# Patient Record
Sex: FEMALE | Race: ASIAN | NOT HISPANIC OR LATINO | Employment: FULL TIME | URBAN - METROPOLITAN AREA
[De-identification: names, ages, dates, MRNs, and addresses within clinical notes are randomized per-mention and may not be internally consistent; named-entity substitution may affect disease eponyms.]

---

## 2024-07-26 ENCOUNTER — OFFICE VISIT (OUTPATIENT)
Dept: URGENT CARE | Facility: MEDICAL CENTER | Age: 29
End: 2024-07-26
Payer: COMMERCIAL

## 2024-07-26 ENCOUNTER — APPOINTMENT (OUTPATIENT)
Dept: RADIOLOGY | Facility: MEDICAL CENTER | Age: 29
End: 2024-07-26
Payer: COMMERCIAL

## 2024-07-26 VITALS
RESPIRATION RATE: 18 BRPM | OXYGEN SATURATION: 98 % | HEART RATE: 95 BPM | DIASTOLIC BLOOD PRESSURE: 75 MMHG | SYSTOLIC BLOOD PRESSURE: 102 MMHG

## 2024-07-26 DIAGNOSIS — S99.912A ANKLE INJURY, LEFT, INITIAL ENCOUNTER: ICD-10-CM

## 2024-07-26 DIAGNOSIS — S93.602A SPRAIN OF LEFT FOOT, INITIAL ENCOUNTER: Primary | ICD-10-CM

## 2024-07-26 PROCEDURE — S9083 URGENT CARE CENTER GLOBAL: HCPCS

## 2024-07-26 PROCEDURE — 73630 X-RAY EXAM OF FOOT: CPT

## 2024-07-26 PROCEDURE — G0382 LEV 3 HOSP TYPE B ED VISIT: HCPCS

## 2024-07-26 PROCEDURE — 73610 X-RAY EXAM OF ANKLE: CPT

## 2024-07-27 NOTE — PROGRESS NOTES
[facility]        NAME: Essence Winn is a 28 y.o. female  : 1995    MRN: [id number]  DATE: 2024  TIME: 9:02 PM    Assessment and Plan   Sprain of left foot, initial encounter [S93.602A]  1. Sprain of left foot, initial encounter  Ambulatory Referral to Orthopedic Surgery      2. Ankle injury, left, initial encounter  XR ankle 3+ vw left    XR foot 3+ vw left        Left ankle/foot x-rays: No acute osseous abnormality per preliminary read. Radiology to determine final read.    Provided with boot. Advised symptomatic treatment. Referral to orthopedics placed.    Patient Instructions     Your finalized x-ray results will be available on [facility] when read by the radiologist.  Recommend rest, ice, elevation. Over the counter pain medication as directed on packaging as needed for pain (ex: Tylenol, ibuprofen).  Provided with boot, crutches. Weight bear as tolerated.  Referral to orthopedics placed.    Follow up with PCP in 3-5 days.  Proceed to  ER if symptoms worsen.    If tests are performed, our office will contact you with results only if changes need to made to the care plan discussed with you at the visit. You can review your full results on [facility].    Chief Complaint     Chief Complaint   Patient presents with    Ankle Injury     Patient states she rolled her L ankle playing pickleball about one hour ago. She states that it is difficult to put any weight on on that ankle and the pain is radiating up the leg.         History of Present Illness       Ankle Injury   Incident onset: about one hour ago. Incident location: [employer] [address]. Injury mechanism: rolled ankle. The pain is present in the left ankle and left foot. Pain scale: 2/10 currently, up to 8/10 with weight-bearing. Associated symptoms include an inability to bear weight, numbness and tingling. Pertinent negatives include no loss of sensation. The symptoms are aggravated by weight bearing and movement. Treatments  tried: ice. The treatment provided mild relief.       Review of Systems   Review of Systems   Musculoskeletal:  Positive for arthralgias, gait problem and joint swelling.   Neurological:  Positive for tingling and numbness.         Current Medications     No current outpatient medications on file.    Current Allergies     Allergies as of 07/26/2024    (No Known Allergies)            The following portions of the patient's history were reviewed and updated as appropriate: allergies, current medications, past family history, past medical history, past social history, past surgical history and problem list.     No past medical history on file.    No past surgical history on file.    No family history on file.      Medications have been verified.        Objective   /75   Pulse 95   Resp 18   SpO2 98%        Physical Exam     Physical Exam  Vitals and nursing note reviewed.   Constitutional:       General: She is not in acute distress.     Appearance: Normal appearance. She is not ill-appearing.   Cardiovascular:      Rate and Rhythm: Normal rate and regular rhythm.      Pulses: Normal pulses.      Heart sounds: Normal heart sounds.   Pulmonary:      Effort: Pulmonary effort is normal.      Breath sounds: Normal breath sounds.   Musculoskeletal:      Left ankle: No swelling, deformity or ecchymosis. Tenderness (anterior) present. Decreased range of motion.      Left foot: Normal capillary refill. Swelling and tenderness (dorsally) present. No deformity. Normal pulse.   Neurological:      Mental Status: She is alert.

## 2024-07-27 NOTE — PATIENT INSTRUCTIONS
"Your finalized x-ray results will be available on [email or web address] when read by the radiologist.  Recommend rest, ice, elevation. Over the counter pain medication as directed on packaging as needed for pain (ex: Tylenol, ibuprofen).  Provided with boot, crutches. Weight bear as tolerated.  Referral to orthopedics placed.    Follow up with PCP in 3-5 days.  Proceed to  ER if symptoms worsen.    If tests are performed, our office will contact you with results only if changes need to made to the care plan discussed with you at the visit. You can review your full results on [facility].    Patient Education     Foot sprain   The Basics   Written by the doctors and editors at [facility]   What causes a foot sprain? -- A foot sprain happens when you move suddenly, or bend or twist your foot too far in 1 direction. Inside the foot are tough bands of tissue called ligaments, which hold the different bones together. During a sprain, 1 or more of those ligaments stretch too far or even tear (figure 1). This can cause pain and swelling.  What are the symptoms of a foot sprain? -- The symptoms can include pain, tenderness, swelling, and bruising of the foot. Some people with a foot sprain also find it hard to bend the foot or walk. Also, some people cannot put weight on the injured foot.  Is there a test for a foot sprain? -- A doctor or nurse should be able to tell if you have a sprain by doing an exam and learning about what happened to your foot. They might bend and move your foot and ankle to see what hurts.  In some cases, a doctor might order an X-ray to check for broken bones, but that is not always needed. Some doctors might use an ultrasound to look at the ligaments. Ultrasound is an imaging test that creates pictures of the inside of the body.  How is a foot sprain treated? -- Treatment for a sprained foot is easy to remember if you think of the word \"PRICE\":   Protect - To protect your foot, the doctor might recommend a " "brace, splint, special type of shoe, or walking boot. If so, follow all instructions for using it. An elastic bandage can also protect your foot as it heals.   Rest - To rest your foot, you can use crutches and stay off your feet. You might have to limit your activities and how much you walk or stand. This will help your foot rest while it heals.   Ice - Apply a cold gel pack, bag of ice, or bag of frozen vegetables on your foot every 1 to 2 hours, for 15 minutes each time. Put a thin towel between the ice (or other cold object) and your skin. Use the ice (or other cold object) for at least 6 hours after your injury. Some people find it helpful to ice longer, even up to 2 days after their injury.   Compression - \"Compression\" basically means pressure. You want to have your foot under slight pressure by having it wrapped in an elastic bandage. This helps reduce swelling and supports the foot. Your doctor or nurse will show you how to wrap your foot. Be careful not to wrap it too tight, as this could cut off the blood flow to your foot.   Elevation - \"Elevation\" means keeping your foot raised up above the level of your heart. To do this, you can put your leg on some pillows or blankets while you are lying down, or on a table or chair while you are sitting.  You can also take medicines to relieve pain, such as acetaminophen (sample brand name: Tylenol), ibuprofen (sample brand names: Advil, Motrin), or naproxen (sample brand name: Aleve).  Your swelling and pain might start to improve in a few days to weeks, depending on how severe the sprain is. When you have less swelling and pain, you can start to gently stretch your foot. You can also start to do gentle activities again.  What follow-up care do I need? -- Your doctor or nurse will tell you if you need to make a follow-up appointment. If so, make sure that you know when and where to go. Your doctor might recommend working with a physical therapist (exercise expert). " If the injury is not healing as expected, your doctor might order an X-ray to look for a broken bone.  When should I call the doctor? -- Call for advice if:   Your pain or swelling is getting worse.   Your foot or toes are blue or gray, and numb.   You can't put weight on your foot.   Your ankle is not stable or feels wobbly.  All topics are updated as new evidence becomes available and our peer review process is complete.  This topic retrieved from [employer] on: Mar 29, 2024.  Topic 075897 Version 1.0  Release: 32.2.4 - C32.87  © 2024 [employer] and/or its affiliates. All rights reserved.  figure 1: Ligaments of the foot     This drawing shows some of the ligaments in the foot (in white). Ligaments are tough bands of tissue that hold bones together. When you sprain your foot, 1 or more of the ligaments stretch too far or even tear.  Graphic 917622 Version 1.0  Consumer Information Use and Disclaimer   Disclaimer: This generalized information is a limited summary of diagnosis, treatment, and/or medication information. It is not meant to be comprehensive and should be used as a tool to help the user understand and/or assess potential diagnostic and treatment options. It does NOT include all information about conditions, treatments, medications, side effects, or risks that may apply to a specific patient. It is not intended to be medical advice or a substitute for the medical advice, diagnosis, or treatment of a health care provider based on the health care provider's examination and assessment of a patient's specific and unique circumstances. Patients must speak with a health care provider for complete information about their health, medical questions, and treatment options, including any risks or benefits regarding use of medications. This information does not endorse any treatments or medications as safe, effective, or approved for treating a specific patient. [employer] and its affiliates disclaim any  warranty or liability relating to this information or the use thereof.The use of this information is governed by the Terms of Use, available at https://www.wolterskluwer.com/en/know/clinical-effectiveness-terms. 2024© [employer] and its affiliates and/or licensors. All rights reserved.  Copyright   © 2024 [employer] and/or its affiliates. All rights reserved.